# Patient Record
Sex: MALE | Race: WHITE | NOT HISPANIC OR LATINO | URBAN - METROPOLITAN AREA
[De-identification: names, ages, dates, MRNs, and addresses within clinical notes are randomized per-mention and may not be internally consistent; named-entity substitution may affect disease eponyms.]

---

## 2017-02-17 ENCOUNTER — EMERGENCY (EMERGENCY)
Facility: HOSPITAL | Age: 47
LOS: 0 days | Discharge: HOME | End: 2017-02-17
Attending: EMERGENCY MEDICINE
Payer: MEDICAID

## 2017-02-17 VITALS
RESPIRATION RATE: 17 BRPM | OXYGEN SATURATION: 99 % | SYSTOLIC BLOOD PRESSURE: 139 MMHG | HEART RATE: 93 BPM | DIASTOLIC BLOOD PRESSURE: 90 MMHG | TEMPERATURE: 99 F

## 2017-02-17 VITALS
WEIGHT: 119.93 LBS | RESPIRATION RATE: 18 BRPM | HEART RATE: 112 BPM | HEIGHT: 66 IN | OXYGEN SATURATION: 98 % | DIASTOLIC BLOOD PRESSURE: 92 MMHG | SYSTOLIC BLOOD PRESSURE: 141 MMHG

## 2017-02-17 DIAGNOSIS — F41.9 ANXIETY DISORDER, UNSPECIFIED: ICD-10-CM
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
## 2017-02-17 DIAGNOSIS — I10 ESSENTIAL (PRIMARY) HYPERTENSION: ICD-10-CM

## 2017-02-17 DIAGNOSIS — S01.511A LACERATION WITHOUT FOREIGN BODY OF LIP, INITIAL ENCOUNTER: ICD-10-CM
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
## 2017-02-17 DIAGNOSIS — F84.0 AUTISTIC DISORDER: ICD-10-CM

## 2017-02-17 PROCEDURE — 99284 EMERGENCY DEPT VISIT MOD MDM: CPT | Mod: 25

## 2017-02-17 PROCEDURE — 12051 INTMD RPR FACE/MM 2.5 CM/<: CPT

## 2017-02-17 RX ORDER — CEFTRIAXONE 500 MG/1
1000 INJECTION, POWDER, FOR SOLUTION INTRAMUSCULAR; INTRAVENOUS ONCE
Qty: 0 | Refills: 0 | Status: COMPLETED | OUTPATIENT
Start: 2017-02-17 | End: 2017-02-17

## 2017-02-17 RX ORDER — CHLORHEXIDINE GLUCONATE 213 G/1000ML
10 SOLUTION TOPICAL
Qty: 1 | Refills: 0 | OUTPATIENT
Start: 2017-02-17 | End: 2017-02-24

## 2017-02-17 RX ORDER — MIDAZOLAM HYDROCHLORIDE 1 MG/ML
2 INJECTION, SOLUTION INTRAMUSCULAR; INTRAVENOUS ONCE
Qty: 0 | Refills: 0 | Status: DISCONTINUED | OUTPATIENT
Start: 2017-02-17 | End: 2017-02-17

## 2017-02-17 RX ORDER — TETANUS TOXOID, REDUCED DIPHTHERIA TOXOID AND ACELLULAR PERTUSSIS VACCINE, ADSORBED 5; 2.5; 8; 8; 2.5 [IU]/.5ML; [IU]/.5ML; UG/.5ML; UG/.5ML; UG/.5ML
0.5 SUSPENSION INTRAMUSCULAR ONCE
Qty: 0 | Refills: 0 | Status: COMPLETED | OUTPATIENT
Start: 2017-02-17 | End: 2017-02-17

## 2017-02-17 RX ORDER — MIDAZOLAM HYDROCHLORIDE 1 MG/ML
1 INJECTION, SOLUTION INTRAMUSCULAR; INTRAVENOUS ONCE
Qty: 0 | Refills: 0 | Status: DISCONTINUED | OUTPATIENT
Start: 2017-02-17 | End: 2017-02-17

## 2017-02-17 RX ORDER — HALOPERIDOL DECANOATE 100 MG/ML
5 INJECTION INTRAMUSCULAR ONCE
Qty: 0 | Refills: 0 | Status: COMPLETED | OUTPATIENT
Start: 2017-02-17 | End: 2017-02-17

## 2017-02-17 RX ADMIN — TETANUS TOXOID, REDUCED DIPHTHERIA TOXOID AND ACELLULAR PERTUSSIS VACCINE, ADSORBED 0.5 MILLILITER(S): 5; 2.5; 8; 8; 2.5 SUSPENSION INTRAMUSCULAR at 17:22

## 2017-02-17 RX ADMIN — MIDAZOLAM HYDROCHLORIDE 1 MILLIGRAM(S): 1 INJECTION, SOLUTION INTRAMUSCULAR; INTRAVENOUS at 17:09

## 2017-02-17 RX ADMIN — CEFTRIAXONE 1000 MILLIGRAM(S): 500 INJECTION, POWDER, FOR SOLUTION INTRAMUSCULAR; INTRAVENOUS at 17:22

## 2017-02-17 RX ADMIN — HALOPERIDOL DECANOATE 5 MILLIGRAM(S): 100 INJECTION INTRAMUSCULAR at 15:47

## 2017-02-17 RX ADMIN — MIDAZOLAM HYDROCHLORIDE 2 MILLIGRAM(S): 1 INJECTION, SOLUTION INTRAMUSCULAR; INTRAVENOUS at 15:46

## 2017-02-17 RX ADMIN — MIDAZOLAM HYDROCHLORIDE 2 MILLIGRAM(S): 1 INJECTION, SOLUTION INTRAMUSCULAR; INTRAVENOUS at 16:10

## 2017-02-17 NOTE — ED ADULT NURSE REASSESSMENT NOTE - NS ED NURSE REASSESS COMMENT FT1
Covering nurse:  Patient received from primary rn Jorge, as per home manager resideent,  patient came from group home, for laceration to upper lip patient is non verbal, noone knows what happened, patient with history of MR, autism

## 2017-02-17 NOTE — ED PROVIDER NOTE - OBJECTIVE STATEMENT
Pertinent PMH/PSH/FHx/SHx and Review of Systems contained within:  46m hx of intellectual disability and htn pw left upper lip laceration. it is unknown when the laceration occurred but for the past few days it has been bleeding regularly. he went to a dentist over the weekend and the presumption is that it occurred then, 1 week ago.  Fh and Sh not otherwise contributory  ROS otherwise negative

## 2017-02-17 NOTE — ED PROVIDER NOTE - PHYSICAL EXAMINATION
Gen: Alert, agitated   Head: NC, AT, dysmorphic facies consistent with intellectual disability    Eyes: PERRL, EOMI, normal lids/conjunctiva  ENT: 1cm laceration in the left upper lip. there is some yellow discharge around it, which is possibly purulent  Neck: supple, no tenderness, Trachea midline  Pulm: Bilateral BS, normal resp effort, no wheeze/stridor/retractions  CV: RRR, no M/R/G, 2+ radial and dp pulses bl, no edema  Abd: soft, NT/ND, +BS, no hepatosplenomegaly  Mskel: extremities x4 with normal ROM and no joint effusions. no ctl spine ttp.   Skin: no rash, no bruising   Neuro: patient is agitated and keeps trying to leave the room. he does not articulate.

## 2017-02-17 NOTE — ED PROVIDER NOTE - MEDICAL DECISION MAKING DETAILS
lip lac. though the age is indeterminate and I don't typically close such lacerations, I fear in this patient with intellectual disability it will never heal has he will continue to bite it and pick it. this is likely why it keeps bleeding. will try to calm him pharmacologically and then suture. will give abx. lip lac. though the age is indeterminate and I don't typically close such lacerations, I fear in this patient with intellectual disability it will never heal has he will continue to bite it and pick it. this is likely why it keeps bleeding. will try to calm him pharmacologically and then suture. will give abx and tetanus.  suture successful. okay for dc.

## 2017-02-28 ENCOUNTER — EMERGENCY (EMERGENCY)
Facility: HOSPITAL | Age: 47
LOS: 0 days | Discharge: HOME | End: 2017-02-28
Attending: EMERGENCY MEDICINE
Payer: MEDICAID

## 2017-02-28 VITALS
WEIGHT: 125 LBS | HEART RATE: 74 BPM | TEMPERATURE: 98 F | DIASTOLIC BLOOD PRESSURE: 76 MMHG | OXYGEN SATURATION: 100 % | RESPIRATION RATE: 16 BRPM | HEIGHT: 67 IN | SYSTOLIC BLOOD PRESSURE: 130 MMHG

## 2017-02-28 DIAGNOSIS — Z88.0 ALLERGY STATUS TO PENICILLIN: ICD-10-CM

## 2017-02-28 DIAGNOSIS — I10 ESSENTIAL (PRIMARY) HYPERTENSION: ICD-10-CM

## 2017-02-28 DIAGNOSIS — F84.0 AUTISTIC DISORDER: ICD-10-CM

## 2017-02-28 DIAGNOSIS — H92.02 OTALGIA, LEFT EAR: ICD-10-CM

## 2017-02-28 DIAGNOSIS — F41.9 ANXIETY DISORDER, UNSPECIFIED: ICD-10-CM

## 2017-02-28 DIAGNOSIS — H61.122 HEMATOMA OF PINNA, LEFT EAR: ICD-10-CM

## 2017-02-28 PROCEDURE — 99283 EMERGENCY DEPT VISIT LOW MDM: CPT

## 2017-02-28 NOTE — ED PROVIDER NOTE - OBJECTIVE STATEMENT
46 years old male brought in by two staffs from the group home [facility] c/o left ear lobe swelling noted yesterday by the staffs. Pt has a hx of autism unable to give detail hx. Staffs sts pt is in his normal mental status.

## 2017-02-28 NOTE — ED PROVIDER NOTE - LEFT EAR
left nontender auricular hematoma 2 cm by 2.5 cm nontender to palp no abrasions no erythema no lac/TM clear left nonerythematous, nontender auricular hematoma 2 cm by 2.5 cm nontender to palp no abrasions no erythema no lac/TM clear

## 2017-02-28 NOTE — ED ADULT TRIAGE NOTE - CHIEF COMPLAINT QUOTE
Left ear swollen and reddened  unknown cause, pt autistic and MR, pt very active and unable to sit still being followed by counselor

## 2017-02-28 NOTE — ED PROVIDER NOTE - CONSTITUTIONAL, MLM
normal... Well appearing, well nourished, awake, alert, oriented to person,  and in no apparent distress. No nasal flaring no shoulders retractions, keeps walking around in ed with a staff

## 2017-03-09 ENCOUNTER — EMERGENCY (EMERGENCY)
Facility: HOSPITAL | Age: 47
LOS: 0 days | Discharge: ANOTHER HOSPITAL | End: 2017-03-10
Attending: EMERGENCY MEDICINE
Payer: MEDICAID

## 2017-03-09 VITALS
RESPIRATION RATE: 18 BRPM | HEIGHT: 67 IN | OXYGEN SATURATION: 98 % | WEIGHT: 120.59 LBS | DIASTOLIC BLOOD PRESSURE: 86 MMHG | HEART RATE: 110 BPM | SYSTOLIC BLOOD PRESSURE: 134 MMHG | TEMPERATURE: 98 F

## 2017-03-09 DIAGNOSIS — Z88.0 ALLERGY STATUS TO PENICILLIN: ICD-10-CM
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
## 2017-03-09 DIAGNOSIS — F84.0 AUTISTIC DISORDER: ICD-10-CM

## 2017-03-09 DIAGNOSIS — H92.02 OTALGIA, LEFT EAR: ICD-10-CM

## 2017-03-09 DIAGNOSIS — F41.9 ANXIETY DISORDER, UNSPECIFIED: ICD-10-CM

## 2017-03-09 DIAGNOSIS — S00.432A CONTUSION OF LEFT EAR, INITIAL ENCOUNTER: ICD-10-CM
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
## 2017-03-09 DIAGNOSIS — I10 ESSENTIAL (PRIMARY) HYPERTENSION: ICD-10-CM
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
## 2017-03-09 PROCEDURE — 99284 EMERGENCY DEPT VISIT MOD MDM: CPT

## 2017-03-09 NOTE — ED PROVIDER NOTE - MEDICAL DECISION MAKING DETAILS
Patient with large left ear hematoma.  VSS.  Patient discussed with Dr. Martinez and explained that patient will need procedural sedation.  Will not sedate prior to transfer unless needed since he will need monitoring.  His caregiver provided consent.  Patient is stable on transfer.

## 2017-03-09 NOTE — ED ADULT TRIAGE NOTE - CHIEF COMPLAINT QUOTE
c/o l ear hematoma x 2 weeks, worsening, seen here 2 weeks ago after self inflicted injury pt resides in adult group home, staff members present

## 2017-03-09 NOTE — ED ADULT NURSE NOTE - OBJECTIVE STATEMENT
Pt is Autistic and presented with lt swollen ear lobe. Pt was presented for same compliant last week. Pt is very agitated, restless and anxious. Pt is from assisted living accompanied by two care takers.

## 2017-03-09 NOTE — ED PROVIDER NOTE - OBJECTIVE STATEMENT
Pertinent PMH/PSH/FHx/SHx and Review of Systems contained within:  Patient with history of MR and autism presents to the ED from group home for left ear hematoma.  Patient was wrestling last week and had injury.  No head impact or LOC.  Seen by Dr. Serna in ER, given ENT follow up.  Returns because still has hematoma which is only slightly larger.  Patient agitated in the ER which is his baseline behavior.

## 2017-03-10 ENCOUNTER — EMERGENCY (EMERGENCY)
Facility: HOSPITAL | Age: 47
LOS: 1 days | Discharge: HOME | End: 2017-03-10
Attending: EMERGENCY MEDICINE | Admitting: EMERGENCY MEDICINE
Payer: MEDICAID

## 2017-03-10 VITALS
OXYGEN SATURATION: 98 % | TEMPERATURE: 98 F | SYSTOLIC BLOOD PRESSURE: 120 MMHG | RESPIRATION RATE: 18 BRPM | DIASTOLIC BLOOD PRESSURE: 72 MMHG | HEART RATE: 77 BPM

## 2017-03-10 VITALS
SYSTOLIC BLOOD PRESSURE: 145 MMHG | RESPIRATION RATE: 19 BRPM | OXYGEN SATURATION: 97 % | DIASTOLIC BLOOD PRESSURE: 101 MMHG | HEART RATE: 117 BPM

## 2017-03-10 VITALS
DIASTOLIC BLOOD PRESSURE: 69 MMHG | RESPIRATION RATE: 16 BRPM | HEART RATE: 68 BPM | OXYGEN SATURATION: 98 % | SYSTOLIC BLOOD PRESSURE: 117 MMHG

## 2017-03-10 PROCEDURE — 99283 EMERGENCY DEPT VISIT LOW MDM: CPT | Mod: 25

## 2017-03-10 RX ORDER — CIPROFLOXACIN LACTATE 400MG/40ML
500 VIAL (ML) INTRAVENOUS ONCE
Qty: 0 | Refills: 0 | Status: COMPLETED | OUTPATIENT
Start: 2017-03-10 | End: 2017-03-10

## 2017-03-10 RX ORDER — HALOPERIDOL DECANOATE 100 MG/ML
5 INJECTION INTRAMUSCULAR ONCE
Qty: 0 | Refills: 0 | Status: COMPLETED | OUTPATIENT
Start: 2017-03-10 | End: 2017-03-10

## 2017-03-10 RX ORDER — HALOPERIDOL DECANOATE 100 MG/ML
2 INJECTION INTRAMUSCULAR ONCE
Qty: 0 | Refills: 0 | Status: COMPLETED | OUTPATIENT
Start: 2017-03-10 | End: 2017-03-10

## 2017-03-10 RX ORDER — KETAMINE HYDROCHLORIDE 100 MG/ML
20 INJECTION INTRAMUSCULAR; INTRAVENOUS ONCE
Qty: 0 | Refills: 0 | Status: DISCONTINUED | OUTPATIENT
Start: 2017-03-10 | End: 2017-03-10

## 2017-03-10 RX ORDER — CIPROFLOXACIN LACTATE 400MG/40ML
1 VIAL (ML) INTRAVENOUS
Qty: 20 | Refills: 0 | OUTPATIENT
Start: 2017-03-10 | End: 2017-03-20

## 2017-03-10 RX ADMIN — HALOPERIDOL DECANOATE 2 MILLIGRAM(S): 100 INJECTION INTRAMUSCULAR at 01:21

## 2017-03-10 RX ADMIN — HALOPERIDOL DECANOATE 5 MILLIGRAM(S): 100 INJECTION INTRAMUSCULAR at 04:00

## 2017-03-10 RX ADMIN — Medication 2 MILLIGRAM(S): at 03:10

## 2017-03-10 RX ADMIN — Medication 2 MILLIGRAM(S): at 01:21

## 2017-03-10 NOTE — ED PROVIDER NOTE - CARE PLAN
Principal Discharge DX:	Hematoma of auricle or pinna, left, initial encounter  Secondary Diagnosis:	MR (mental retardation)

## 2017-03-10 NOTE — ED PROVIDER NOTE - MEDICAL DECISION MAKING DETAILS
Will call ENT for auricular hematoma drainage, consider conscious sedation if IM Haldol/Ativan are insufficient for pt to tolerate procedure

## 2017-03-10 NOTE — ED PROVIDER NOTE - ATTENDING CONTRIBUTION TO CARE
I was physically present for the E/M service provided. I agree with above history, physical, and plan which I have reviewed and edited where appropriate. I was physically present for the key portions of the service provided.    Pt transferred from [facility] ED for ENT drainage of left auricular hematoma worsening x1 week. No recent trauma. h/o MR.  -Pt given haldol 5 mg IM and ativan 2 mg IM for agitation upon arrival, accompanied by 2 staff members from group home  -Procedure performed by ENT resident at bedside  -Cipro PO ppx  -f/u ENT in 10 days

## 2017-03-10 NOTE — ED ADULT NURSE NOTE - CHIEF COMPLAINT QUOTE
Pt transferred from [facility] for L auricular hematoma that has been getting worse.  Pt autistic and non verbal.  Currently calm after 2mg Haldol and 2mg Ativan at 0116h given by RN at [place].  Pt aggressive and uncooperative at baseline.

## 2017-03-10 NOTE — ED ADULT NURSE NOTE - NS PRO AD NO ADVANCE DIRECTIVE
pt group home [facility] as per staff no proxy or DNR as per staff with pt" I don't know if he has a proxy named."

## 2017-03-10 NOTE — ED PROVIDER NOTE - OBJECTIVE STATEMENT
46M with MR, autism, HTN p/w L auricular hematoma x 1 week. Pt was noted by staff at his group home wrestling 1 week ago, then developed swelling to L ear. Went to  ED today and transferred to [facility] for ENT. Pt nonverbal, agitated at baseline, received Haldol/Ativan prior to transfer. Pt intermittent agitated on eval. Aides deny recent illness, vomiting, fever, CP, SOB.

## 2017-03-10 NOTE — ED PROVIDER NOTE - PROGRESS NOTE DETAILS
Auricular hematoma drained under local anesthetic by ENT resident. Pt tolerated procedure well. [place] PGY3: Pt given dose of cipro in ED, rx for same x 10 days, info provided for ENT f/u, information given to aides as pt autistic, nonverbal, unable to follow commands. Ready for d/c Pt observed for 3 hours after haldol and ativan. Awake/alert discharged in custody of health aides to group home.

## 2017-03-10 NOTE — ED ADULT NURSE NOTE - OBJECTIVE STATEMENT
Pt arrives to [facility] with staff x2 at bedside. Pt arrives awake attempting to climb off stretcher...pulling off ID bands. As per staff last Tuesday he was seen at [facility] for left ear swelling..he was slapping himself but it was not bad told warm compressess...yesterday looked worse very swollen.. so we brought him back to hospital." Pt arrives to Trauma A with staff x2 at bedside. Pt arrives awake attempting to climb off stretcher...pulling off ID bands. As per staff last Tuesday he was seen at [facility] for left ear swelling..he was slapping himself but it was not bad told warm compressess...yesterday looked worse very swollen.. so we brought him back to hospital." + left ear swelling/black&blue discolored...ENT called treatment plan to I & D and discharge on antibiotics.. No IV no labs at this time.  Pt responding to Ativan..appears calmer, redirectable. Siderails elevated, bed low, staff x2 remains with Pt.

## 2017-03-10 NOTE — ED ADULT NURSE REASSESSMENT NOTE - NS ED NURSE REASSESS COMMENT FT1
Pt tolerating I & D well...sm amt of bloody drainage from auricle....sutures placed s/p local block by ENT Vahid... DSD to left ear secured with cling/kerlix in place. vss staff x2 remains with pt. Handoff report to Primary RN Margaret to follow. Pt sleeping easily arousable with tactile/verbal stimuli.  Pt appear to be in no discomfort at present time.

## 2017-03-10 NOTE — ED ADULT TRIAGE NOTE - CHIEF COMPLAINT QUOTE
Pt transferred from [facility] for L auricular hematoma that has been getting worse.  Pt autistic and non verbal.  Currently calm after 2mg Haldol and 2mg Ativan at 0116h given by RN at [place].  Pt aggressive and uncooperative at baseline.

## 2017-03-13 PROBLEM — Z00.00 ENCOUNTER FOR PREVENTIVE HEALTH EXAMINATION: Status: ACTIVE | Noted: 2017-03-13

## 2017-03-20 ENCOUNTER — OUTPATIENT (OUTPATIENT)
Dept: OUTPATIENT SERVICES | Facility: HOSPITAL | Age: 47
LOS: 1 days | Discharge: HOME | End: 2017-03-20

## 2017-03-20 ENCOUNTER — APPOINTMENT (OUTPATIENT)
Dept: OTOLARYNGOLOGY | Facility: CLINIC | Age: 47
End: 2017-03-20

## 2017-03-20 VITALS
HEIGHT: 67 IN | BODY MASS INDEX: 18.05 KG/M2 | HEART RATE: 70 BPM | DIASTOLIC BLOOD PRESSURE: 75 MMHG | SYSTOLIC BLOOD PRESSURE: 122 MMHG | WEIGHT: 115 LBS

## 2017-03-29 ENCOUNTER — EMERGENCY (EMERGENCY)
Facility: HOSPITAL | Age: 47
LOS: 1 days | Discharge: HOME | End: 2017-03-29
Attending: EMERGENCY MEDICINE | Admitting: EMERGENCY MEDICINE
Payer: MEDICAID

## 2017-03-29 VITALS
SYSTOLIC BLOOD PRESSURE: 145 MMHG | DIASTOLIC BLOOD PRESSURE: 78 MMHG | HEART RATE: 120 BPM | RESPIRATION RATE: 18 BRPM | OXYGEN SATURATION: 97 %

## 2017-03-29 VITALS — TEMPERATURE: 97 F

## 2017-03-29 DIAGNOSIS — M95.12 CAULIFLOWER EAR, LEFT EAR: ICD-10-CM

## 2017-03-29 LAB
APPEARANCE UR: CLEAR — SIGNIFICANT CHANGE UP
BILIRUB UR-MCNC: NEGATIVE — SIGNIFICANT CHANGE UP
BLOOD UR QL VISUAL: NEGATIVE — SIGNIFICANT CHANGE UP
COLOR SPEC: SIGNIFICANT CHANGE UP
GLUCOSE UR-MCNC: NEGATIVE — SIGNIFICANT CHANGE UP
KETONES UR-MCNC: SIGNIFICANT CHANGE UP
LEUKOCYTE ESTERASE UR-ACNC: NEGATIVE — SIGNIFICANT CHANGE UP
MUCOUS THREADS # UR AUTO: SIGNIFICANT CHANGE UP
NITRITE UR-MCNC: NEGATIVE — SIGNIFICANT CHANGE UP
PH UR: 7 — SIGNIFICANT CHANGE UP (ref 4.6–8)
PROT UR-MCNC: NEGATIVE — SIGNIFICANT CHANGE UP
RBC CASTS # UR COMP ASSIST: SIGNIFICANT CHANGE UP (ref 0–?)
SP GR SPEC: 1.02 — SIGNIFICANT CHANGE UP (ref 1–1.03)
UROBILINOGEN FLD QL: NORMAL E.U. — SIGNIFICANT CHANGE UP (ref 0.1–0.2)
WBC UR QL: SIGNIFICANT CHANGE UP (ref 0–?)

## 2017-03-29 PROCEDURE — 99283 EMERGENCY DEPT VISIT LOW MDM: CPT

## 2017-03-29 NOTE — ED ADULT TRIAGE NOTE - CHIEF COMPLAINT QUOTE
Pt arrives from group home with 2 aides, history of MR and severe autism. Per aides, pt was noted to have bruising to his groin this morning, and was noticed to be limping. Unable to obtain temp in triage. Pt tachycardic, per aides, pt's heart rate is high at baseline.

## 2017-03-29 NOTE — ED PROVIDER NOTE - MEDICAL DECISION MAKING DETAILS
pt with penile bruising, non suspicious story however pt unable to provide hx, likely 2/2 agitated autistic behavior, able to void w/o difficulty.

## 2017-03-29 NOTE — ED PROVIDER NOTE - ATTENDING CONTRIBUTION TO CARE
[place]to  pt noted to have bruise at base of  glans   no difficulty voiding in ED  no blood in urine  no other bruising seen  testicles nontender not swollen    Pt with h/o self injury in past  and this is likely result of same  by exam and UA no acute  problem

## 2017-03-29 NOTE — ED PROVIDER NOTE - OBJECTIVE STATEMENT
47yo m pmh , milka p/w right penile bruise. While at patients facility staff noted bruising to penis while bathing him today. No bruising yesterday. no known hx of trauma. Staff also notes widened gate today. Pt has hx of self harm and grabbing.

## 2017-03-29 NOTE — ED ADULT NURSE NOTE - OBJECTIVE STATEMENT
Pt presents to room 19 from [facility] Facility with two staff members at bedside. Per staff members, pt presents to ED for bruise to the Penis. Purple area visualized on anterior base of penis, reddened area of swelling observed on penis, skin intact. Skin otherwise intact, pt ambulatory. Pt with intermittent periods of agitation, staff members at bedside redirecting patient with good effect. Staff members state patient usually becomes more agitated when seen at this hospital. VS documented per flow, UA sent per orders. No additional orders at this time. Safety maintained, will continue to monitor.

## 2017-05-15 ENCOUNTER — APPOINTMENT (OUTPATIENT)
Dept: OTOLARYNGOLOGY | Facility: CLINIC | Age: 47
End: 2017-05-15

## 2018-10-01 ENCOUNTER — EMERGENCY (EMERGENCY)
Facility: HOSPITAL | Age: 48
LOS: 1 days | Discharge: HOME | End: 2018-10-01
Attending: EMERGENCY MEDICINE | Admitting: EMERGENCY MEDICINE
Payer: MEDICAID

## 2018-10-01 VITALS
OXYGEN SATURATION: 100 % | RESPIRATION RATE: 18 BRPM | HEART RATE: 92 BPM | DIASTOLIC BLOOD PRESSURE: 99 MMHG | SYSTOLIC BLOOD PRESSURE: 138 MMHG

## 2018-10-01 PROBLEM — F84.0 AUTISTIC DISORDER: Chronic | Status: ACTIVE | Noted: 2017-03-10

## 2018-10-01 PROBLEM — F84.0 AUTISTIC DISORDER: Chronic | Status: ACTIVE | Noted: 2017-02-17

## 2018-10-01 PROBLEM — F79 UNSPECIFIED INTELLECTUAL DISABILITIES: Chronic | Status: ACTIVE | Noted: 2017-03-10

## 2018-10-01 PROBLEM — I10 ESSENTIAL (PRIMARY) HYPERTENSION: Chronic | Status: ACTIVE | Noted: 2017-02-17

## 2018-10-01 PROBLEM — F41.9 ANXIETY DISORDER, UNSPECIFIED: Chronic | Status: ACTIVE | Noted: 2017-02-17

## 2018-10-01 PROCEDURE — 12001 RPR S/N/AX/GEN/TRNK 2.5CM/<: CPT

## 2018-10-01 PROCEDURE — 99282 EMERGENCY DEPT VISIT SF MDM: CPT | Mod: 25

## 2018-10-01 RX ORDER — TETANUS TOXOID, REDUCED DIPHTHERIA TOXOID AND ACELLULAR PERTUSSIS VACCINE, ADSORBED 5; 2.5; 8; 8; 2.5 [IU]/.5ML; [IU]/.5ML; UG/.5ML; UG/.5ML; UG/.5ML
0.5 SUSPENSION INTRAMUSCULAR ONCE
Qty: 0 | Refills: 0 | Status: COMPLETED | OUTPATIENT
Start: 2018-10-01 | End: 2018-10-01

## 2018-10-01 RX ADMIN — TETANUS TOXOID, REDUCED DIPHTHERIA TOXOID AND ACELLULAR PERTUSSIS VACCINE, ADSORBED 0.5 MILLILITER(S): 5; 2.5; 8; 8; 2.5 SUSPENSION INTRAMUSCULAR at 10:44

## 2018-10-01 NOTE — ED PROCEDURE NOTE - CPROC ED LACERATION CLEANSED1
irrigated (see in FT below)/extensive cleaning/cleansed/copious irrigation/removal of particulate matter copious irrigation/extensive cleaning/cleansed/irrigated (see in FT below)

## 2018-10-01 NOTE — ED PROVIDER NOTE - CARE PLAN
Principal Discharge DX:	Scalp laceration  Assessment and plan of treatment:	Your diagnosis: scalp laceration    Discharge instructions:    1. Please follow-up with your Primary Care Doctor in 1-2 days.    2. Please return to the ED or have your primary care doctor remove the staples in 7 days.    3. Take any prescribed medications as instructed:    4. Others: Keep the wound clean and dry for the first 12-24 hours. Afterwards, you may clean the wound with mild soap and water 2 times a day. Dry completely and then apply a thin layer of Bacitracin over the wound.    5. Be sure to return to the ED if you develop new or worsening symptoms. Specific signs and symptoms to be vigilant of: fever or chills, redness of wound, increased pain at wound site, swelling at the wound site, pus or drainage at the wound site, bleeding from the wound that does not stop, change in color of the wound such as blue or white, numbness or tingling around wound site.

## 2018-10-01 NOTE — ED PROVIDER NOTE - OBJECTIVE STATEMENT
47M with MR presenting after hitting his head in the shower today causing a 2.5 cm head laceration. No LOC, blood thinners. Patient did not fall. 47M with MR presenting after hitting his head in the shower today causing a 2.5 cm head laceration. No LOC, blood thinners. Patient did not fall.  Aide at bedside knows pt and states that he is acting at his baseline.

## 2018-10-01 NOTE — ED ADULT TRIAGE NOTE - CHIEF COMPLAINT QUOTE
pt BIBA from group home.  pt was in the bathroom and hit his head on the window sill.  no LOC, appears in NAD, pt is non-verbal at baseline

## 2018-10-01 NOTE — ED PROVIDER NOTE - MEDICAL DECISION MAKING DETAILS
47M with MR presenting after hitting his head in the shower today causing a 2.5 cm head laceration. 47M with MR presenting after hitting his head in the shower today causing a 2.5 cm head laceration.  Acting at baseline.  Plan to repair laceration and re-eval.

## 2018-10-01 NOTE — ED PROVIDER NOTE - PLAN OF CARE
Your diagnosis: scalp laceration    Discharge instructions:    1. Please follow-up with your Primary Care Doctor in 1-2 days.    2. Please return to the ED or have your primary care doctor remove the staples in 7 days.    3. Take any prescribed medications as instructed:    4. Others: Keep the wound clean and dry for the first 12-24 hours. Afterwards, you may clean the wound with mild soap and water 2 times a day. Dry completely and then apply a thin layer of Bacitracin over the wound.    5. Be sure to return to the ED if you develop new or worsening symptoms. Specific signs and symptoms to be vigilant of: fever or chills, redness of wound, increased pain at wound site, swelling at the wound site, pus or drainage at the wound site, bleeding from the wound that does not stop, change in color of the wound such as blue or white, numbness or tingling around wound site.

## 2018-10-01 NOTE — ED PROVIDER NOTE - ATTENDING CONTRIBUTION TO CARE
Dr. Ames: 46 yo male with mental retardation, living in a group setting, brought to ED today due to left scalp laceration when pt hit head on wall while in the shower.  No fall or LOC.  Incident occurred approx 3+ hours before ED presentation and as per aide, pt has been acting normal and at his baseline since incident.  Pt is not on any blood thinners.  No N/V/D or fevers.  No seizures.  On exam pt overall well appearing, in NAD, heart RRR, lungs CTAB, abd NTND, extremities without swelling, strength 5/5 in all extremities and skin without rash.  Left parietal scalp with linear superficial 2.5 cm scalp laceration with no active bleeding, nontender surrounding bony area and no step off or crepitus.  Neck and spine midline nontender to palpation.

## 2018-10-01 NOTE — ED PROVIDER NOTE - PHYSICAL EXAMINATION
General: NAD, well-appearing, awake and responsive  HEENT: Airway patent  Cardiovascular: RRR, S1 and S2 appreciable, no murmurs  Pulmonary: CTAB, no crackles, rales, rhonchi, or wheezing  Gastrointestinal: Abdomen nondistended, soft, nontender, no guarding or rebound tenderness  Neurologic: Alert and oriented  MSK: Moving 4 extremities; no obvious skull fractures or step-offs  Skin: 2.5 cm superficial, nonbleeding laceration on left parietal region

## 2019-03-29 NOTE — ED ADULT NURSE NOTE - PAIN: PRESENCE, MLM
Response from Insurance received: PA request for Maxalt approved. Faxed to pharmacy.   
non-verbal indicator of pain/discomfort present

## 2019-10-29 NOTE — ED PROVIDER NOTE - TEMPLATE, MLM
EENMT Bi-Rhombic Flap Text: The defect edges were debeveled with a #15c scalpel blade.  Given the location of the defect and the proximity to free margins a bi-rhombic flap was deemed most appropriate.  Using a sterile surgical marker, an appropriate rhombic flap was drawn incorporating the defect. The area thus outlined was incised deep to adipose tissue with a #15 scalpel blade.  The skin margins were undermined to an appropriate distance in all directions utilizing iris scissors.

## 2019-11-22 ENCOUNTER — TRANSCRIPTION ENCOUNTER (OUTPATIENT)
Age: 49
End: 2019-11-22

## 2021-09-28 NOTE — ED PROCEDURE NOTE - ATTENDING CONTRIBUTION TO CARE
Dr. Ames: I personally supervised this procedure performed by the resident and I agree with the above documentation.
27-Sep-2021 12:00

## 2022-04-29 NOTE — ED ADULT NURSE NOTE - NS ED NURSE LEVEL OF CONSCIOUSNESS MENTAL STATUS
Awake
Pt BIBA c/o cold exposure and pain in B/L hands/feet. Pt told EMS he is a diabetic, FS in field 467.

## 2023-03-13 ENCOUNTER — APPOINTMENT (OUTPATIENT)
Dept: OTOLARYNGOLOGY | Facility: CLINIC | Age: 53
End: 2023-03-13

## 2024-04-15 ENCOUNTER — APPOINTMENT (OUTPATIENT)
Dept: OTOLARYNGOLOGY | Facility: CLINIC | Age: 54
End: 2024-04-15
Payer: MEDICAID

## 2024-04-15 ENCOUNTER — OUTPATIENT (OUTPATIENT)
Dept: OUTPATIENT SERVICES | Facility: HOSPITAL | Age: 54
LOS: 1 days | Discharge: HOME | End: 2024-04-15

## 2024-04-15 VITALS — WEIGHT: 137 LBS | BODY MASS INDEX: 22.02 KG/M2 | HEIGHT: 66 IN

## 2024-04-15 DIAGNOSIS — M95.12 CAULIFLOWER EAR, LEFT EAR: ICD-10-CM

## 2024-04-15 DIAGNOSIS — H93.8X3 OTHER SPECIFIED DISORDERS OF EAR, BILATERAL: ICD-10-CM

## 2024-04-15 DIAGNOSIS — H61.23 IMPACTED CERUMEN, BILATERAL: ICD-10-CM

## 2024-04-15 PROCEDURE — 69210 REMOVE IMPACTED EAR WAX UNI: CPT

## 2024-04-15 RX ORDER — MULTIVITAMIN
TABLET ORAL
Refills: 0 | Status: ACTIVE | COMMUNITY

## 2024-04-15 RX ORDER — POTASSIUM CHLORIDE 20 MEQ
TABLET, EXT RELEASE, PARTICLES/CRYSTALS ORAL
Refills: 0 | Status: ACTIVE | COMMUNITY

## 2024-04-15 RX ORDER — FENOFIBRATE 145 MG/1
TABLET ORAL
Refills: 0 | Status: ACTIVE | COMMUNITY

## 2024-04-15 RX ORDER — CHLORHEXIDINE GLUCONATE 4 %
LIQUID (ML) TOPICAL
Refills: 0 | Status: ACTIVE | COMMUNITY

## 2024-04-15 RX ORDER — HYDROCHLOROTHIAZIDE 12.5 MG/1
TABLET ORAL
Refills: 0 | Status: ACTIVE | COMMUNITY

## 2024-04-15 RX ORDER — NIFEDIPINE 10 MG
CAPSULE ORAL
Refills: 0 | Status: ACTIVE | COMMUNITY

## 2024-04-15 RX ORDER — OLANZAPINE 20 MG/1
TABLET, FILM COATED ORAL
Refills: 0 | Status: ACTIVE | COMMUNITY

## 2024-04-15 RX ORDER — ATORVASTATIN CALCIUM 80 MG/1
TABLET, FILM COATED ORAL
Refills: 0 | Status: ACTIVE | COMMUNITY

## 2024-04-15 RX ORDER — CICLOPIROX 7.7 MG/G
0.77 GEL TOPICAL
Refills: 0 | Status: ACTIVE | COMMUNITY

## 2024-04-15 NOTE — PHYSICAL EXAM
[Normal] : mucosa is normal [Midline] : trachea located in midline position [FreeTextEntry7] : Cauliflower Ear non-obstructing ear canal [de-identified] : Poor Denition

## 2024-04-15 NOTE — ASSESSMENT
[FreeTextEntry1] : 53 year non-verbal M with bilateral cerumen impaction and Left Chronic Cauliflower Ear 2/2 previous trauma 2017  Patient tolerated cerumen removal without complaints.   Physical exam shows bilateral ears normal EAC/TM.    Recommend: Cerumen Impaction -Discussed not using q-tips or instruments to remove wax -Discussed that the ear is a self cleaning structure and just allow it clean itself. If wax builds up can try debrox. Once it gets impacted recommend return to get it cleaned out.   Left Chronic Cauliflower Ear -Discussed with patient that given history of recurrent auricular hematoma cosmetically the ear may not return to its original shape -The main cause of cauliflower ear usually trauma. When an ear injury occurs, the blood vessels that supply nutrients to the cartilage can tear. This causes blood to accumulate between your cartilage and your perichondrium. -When the blood supply to your ear is cut off, the cartilage no longer receives the nutrients it needs. As a result, tissue death (necrosis) occurs and as new cartilage begins to grow this new cartilage is usually lumpy, deformed and asymmetrical  -Return to clinic annually or sooner if new/worsen symptoms present

## 2024-04-15 NOTE — HISTORY OF PRESENT ILLNESS
[de-identified] : 53 year old male, former patient of Dr. Muniz, presents for initial visit for general ENT evaluation. Resides at [facility] home accompanied by staff member Maite. PMH: Left cauliflower ear Patient is non-verbal and Autistic. Formal caregiver denies s/s of otalgia, otorrhea, recent fevers or ear infections.  No concerns with hearing.

## 2024-04-15 NOTE — REASON FOR VISIT
[Initial Evaluation] : an initial evaluation for [Formal Caregiver] : formal caregiver [Other: _____] : [unfilled] [FreeTextEntry2] : general ENT evaluation

## 2024-04-19 DIAGNOSIS — H61.23 IMPACTED CERUMEN, BILATERAL: ICD-10-CM

## 2024-04-19 DIAGNOSIS — M95.12 CAULIFLOWER EAR, LEFT EAR: ICD-10-CM

## 2024-04-19 DIAGNOSIS — H93.8X3 OTHER SPECIFIED DISORDERS OF EAR, BILATERAL: ICD-10-CM

## 2024-08-20 NOTE — ED ADULT TRIAGE NOTE - NS ED TRIAGE AVPU SCALE
[FreeTextEntry2] : Patient comes in for locking of the right small finger.
Alert-The patient is alert, awake and responds to voice. The patient is oriented to time, place, and person. The triage nurse is able to obtain subjective information.

## 2025-07-21 NOTE — ED ADULT NURSE NOTE - THOUGHTS OF HOMICIDE/VIOLENCE TOWARDS OTHERS YN, MLM
Medication:  predniSONE 20 mg tablet    Dose/Frequency:   Take 2 tabs daily x5 days then decrease to 1 tab daily x 5 days        Quantity: 15    Pharmacy: : [id number] [address] [facility]     Office:   [x] PCP/Provider -   [] Speciality/Provider -     Does the patient have enough for 3 days?   [] Yes   [x] No - Send as HP to POD    Medication:  meclizine (ANTIVERT) 25 mg tablet    Dose/Frequency: Take 1 tablet (25 mg total) by mouth 3 (three) times a day as needed for dizziness     Quantity: 30    Pharmacy: : [id number] [address] [facility]     Office:   [x] PCP/Provider -   [] Speciality/Provider -     Does the patient have enough for 3 days?   [] Yes   [x] No - Send as HP to POD       unknown